# Patient Record
Sex: MALE | Race: WHITE
[De-identification: names, ages, dates, MRNs, and addresses within clinical notes are randomized per-mention and may not be internally consistent; named-entity substitution may affect disease eponyms.]

---

## 2022-11-13 ENCOUNTER — HOSPITAL ENCOUNTER (EMERGENCY)
Dept: HOSPITAL 26 - MED | Age: 77
Discharge: HOME | End: 2022-11-13
Payer: COMMERCIAL

## 2022-11-13 VITALS — WEIGHT: 150 LBS | HEIGHT: 68 IN | BODY MASS INDEX: 22.73 KG/M2

## 2022-11-13 VITALS — SYSTOLIC BLOOD PRESSURE: 128 MMHG | DIASTOLIC BLOOD PRESSURE: 64 MMHG

## 2022-11-13 VITALS — DIASTOLIC BLOOD PRESSURE: 87 MMHG | SYSTOLIC BLOOD PRESSURE: 136 MMHG

## 2022-11-13 DIAGNOSIS — R04.0: Primary | ICD-10-CM

## 2022-11-13 LAB
ALBUMIN FLD-MCNC: 3.4 G/DL (ref 3.4–5)
ANION GAP SERPL CALCULATED.3IONS-SCNC: 13 MMOL/L (ref 8–16)
AST SERPL-CCNC: 24 U/L (ref 15–37)
BASOPHILS # BLD AUTO: 0 K/UL (ref 0–0.22)
BASOPHILS NFR BLD AUTO: 0.6 % (ref 0–2)
BILIRUB SERPL-MCNC: 0.6 MG/DL (ref 0–1)
BUN SERPL-MCNC: 31 MG/DL (ref 7–18)
CHLORIDE SERPL-SCNC: 99 MMOL/L (ref 98–107)
CO2 SERPL-SCNC: 28.3 MMOL/L (ref 21–32)
CREAT SERPL-MCNC: 0.9 MG/DL (ref 0.6–1.3)
EOSINOPHIL # BLD AUTO: 0.2 K/UL (ref 0–0.4)
EOSINOPHIL NFR BLD AUTO: 3.3 % (ref 0–4)
ERYTHROCYTE [DISTWIDTH] IN BLOOD BY AUTOMATED COUNT: 13.6 % (ref 11.6–13.7)
GFR SERPL CREATININE-BSD FRML MDRD: (no result) ML/MIN (ref 90–?)
GLUCOSE SERPL-MCNC: 101 MG/DL (ref 74–106)
HCT VFR BLD AUTO: 37.2 % (ref 36–52)
HGB BLD-MCNC: 12.5 G/DL (ref 12–18)
LYMPHOCYTES # BLD AUTO: 1 K/UL (ref 2–11.5)
LYMPHOCYTES NFR BLD AUTO: 16.3 % (ref 20.5–51.1)
MCH RBC QN AUTO: 32 PG (ref 27–31)
MCHC RBC AUTO-ENTMCNC: 34 G/DL (ref 33–37)
MCV RBC AUTO: 94.4 FL (ref 80–94)
MONOCYTES # BLD AUTO: 0.7 K/UL (ref 0.8–1)
MONOCYTES NFR BLD AUTO: 11.5 % (ref 1.7–9.3)
NEUTROPHILS # BLD AUTO: 4.4 K/UL (ref 1.8–7.7)
NEUTROPHILS NFR BLD AUTO: 68.3 % (ref 42.2–75.2)
PLATELET # BLD AUTO: 298 K/UL (ref 140–450)
POTASSIUM SERPL-SCNC: 4.3 MMOL/L (ref 3.5–5.1)
RBC # BLD AUTO: 3.94 MIL/UL (ref 4.2–6.1)
SODIUM SERPL-SCNC: 136 MMOL/L (ref 136–145)
WBC # BLD AUTO: 6.4 K/UL (ref 4.8–10.8)

## 2022-11-13 NOTE — NUR
spoke with marisela malik for report back to facility, pt has been cleared by md brown 
to go back to [facility]. marisela malik states she will not take back the patient 
at this time because, "pt is altered and needs labs and urine tests". had her 
speak with kevin calderon, agreed on cbc and cmp labs at this time. when asked if 
transport was available, states "we cant take him back unless we have labs or 
something done for him."

## 2022-11-13 NOTE — NUR
76 y/o male biba from [facility], c/o nose bleed after picking nose today. 
pt a&ox4, baseline gcs 14 dementia. bleed resolved with pressure at this time. 
denies any symptoms at this time. pt has gtube in place, calvin valve added for 
pressure control. 10ml residuals, 50 ml sterile water flushed. . patient 
positioned for comfort. hob elevated. bed down. ermd made aware of pt.



pmh: epilepsy, htn, hld, dementia

nka

med: eliquis

## 2022-11-13 NOTE — NUR
Patient discharged with v/s stable. Written and verbal after care instructions 
given and explained. 

Patient verbalized understanding. [facility]. All 
questions addressed prior to discharge. Advised to follow up with PMD.

## 2022-11-13 NOTE — NUR
facility called back for report on lab findings for pt, spoke with marisela malik and 
states "we can not take him because he needs ammonia levels done and we do not 
have transport". spoke with aruna rodgers rn supervisor, states she will call us 
back with update on transport.